# Patient Record
Sex: MALE | Race: WHITE
[De-identification: names, ages, dates, MRNs, and addresses within clinical notes are randomized per-mention and may not be internally consistent; named-entity substitution may affect disease eponyms.]

---

## 2018-03-11 ENCOUNTER — HOSPITAL ENCOUNTER (EMERGENCY)
Dept: HOSPITAL 17 - NEPE | Age: 53
Discharge: HOME | End: 2018-03-11

## 2018-03-11 VITALS
OXYGEN SATURATION: 98 % | HEART RATE: 87 BPM | SYSTOLIC BLOOD PRESSURE: 113 MMHG | DIASTOLIC BLOOD PRESSURE: 72 MMHG | RESPIRATION RATE: 18 BRPM

## 2018-03-11 VITALS — TEMPERATURE: 98.7 F | OXYGEN SATURATION: 96 % | RESPIRATION RATE: 18 BRPM | HEART RATE: 88 BPM

## 2018-03-11 VITALS — BODY MASS INDEX: 25.73 KG/M2 | HEIGHT: 68 IN | WEIGHT: 169.76 LBS

## 2018-03-11 VITALS — OXYGEN SATURATION: 98 %

## 2018-03-11 DIAGNOSIS — S09.90XA: Primary | ICD-10-CM
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
## 2018-03-11 PROCEDURE — 70450 CT HEAD/BRAIN W/O DYE: CPT

## 2018-03-11 PROCEDURE — 99284 EMERGENCY DEPT VISIT MOD MDM: CPT

## 2018-03-11 PROCEDURE — 99291 CRITICAL CARE FIRST HOUR: CPT

## 2018-03-11 PROCEDURE — 72125 CT NECK SPINE W/O DYE: CPT

## 2018-03-11 PROCEDURE — G0390 TRAUMA RESPONS W/HOSP CRITI: HCPCS
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
